# Patient Record
Sex: MALE
[De-identification: names, ages, dates, MRNs, and addresses within clinical notes are randomized per-mention and may not be internally consistent; named-entity substitution may affect disease eponyms.]

---

## 2023-03-20 PROBLEM — Z00.00 ENCOUNTER FOR PREVENTIVE HEALTH EXAMINATION: Status: ACTIVE | Noted: 2023-03-20

## 2023-03-20 PROCEDURE — 36415 COLL VENOUS BLD VENIPUNCTURE: CPT

## 2023-03-23 ENCOUNTER — APPOINTMENT (OUTPATIENT)
Dept: OTOLARYNGOLOGY | Facility: CLINIC | Age: 33
End: 2023-03-23
Payer: COMMERCIAL

## 2023-03-23 DIAGNOSIS — H61.23 IMPACTED CERUMEN, BILATERAL: ICD-10-CM

## 2023-03-23 DIAGNOSIS — H93.8X3 OTHER SPECIFIED DISORDERS OF EAR, BILATERAL: ICD-10-CM

## 2023-03-23 DIAGNOSIS — H92.03 OTALGIA, BILATERAL: ICD-10-CM

## 2023-03-23 DIAGNOSIS — K21.9 GASTRO-ESOPHAGEAL REFLUX DISEASE W/OUT ESOPHAGITIS: ICD-10-CM

## 2023-03-23 PROCEDURE — 99203 OFFICE O/P NEW LOW 30 MIN: CPT | Mod: 25

## 2023-03-23 NOTE — REASON FOR VISIT
[Initial Evaluation] : an initial evaluation for [FreeTextEntry2] : Pain in ears for the past couple of days.  Patient states his level of severity is a level 8 out of 10 and it occurs constant.  Patient states nothing helps to improve or worsens his Pain in ears for the past couple of days.

## 2023-03-23 NOTE — PHYSICAL EXAM
[de-identified] :  bilateral cerumen impaction. removed [Normal] : mucosa is normal [Midline] : trachea located in midline position

## 2023-03-23 NOTE — REVIEW OF SYSTEMS
[Patient Intake Form Reviewed] : Patient intake form was reviewed [Ear Pain] : ear pain [As Noted in HPI] : as noted in HPI [Throat Pain] : throat pain [Throat Dryness] : throat dryness [Negative] : Heme/Lymph

## 2023-03-23 NOTE — PROCEDURE
[Cerumen Impaction] : Cerumen Impaction [] : Removal of Cerumen [FreeTextEntry5] : Mitchell suction #5, Ear curette, Ear Alligator

## 2023-03-23 NOTE — HISTORY OF PRESENT ILLNESS
[de-identified] : 32 years old male patient with history of Pain in ears for the past couple of days.   Patient is present today in the office with bilateral cerumen impaction. Reflux  GERD

## 2023-03-30 LAB
AR GENE MUT ANL BLD/T: NEGATIVE
CFTR MUT TESTED BLD/T: NEGATIVE
GENE DIS ANL CARRIER INTERP-IMP: POSITIVE
SMN1 GENE MUT ANL BLD/T: NORMAL